# Patient Record
Sex: FEMALE | Race: BLACK OR AFRICAN AMERICAN | NOT HISPANIC OR LATINO | URBAN - METROPOLITAN AREA
[De-identification: names, ages, dates, MRNs, and addresses within clinical notes are randomized per-mention and may not be internally consistent; named-entity substitution may affect disease eponyms.]

---

## 2017-05-07 ENCOUNTER — EMERGENCY (EMERGENCY)
Facility: HOSPITAL | Age: 57
LOS: 1 days | Discharge: HOME | End: 2017-05-07
Attending: EMERGENCY MEDICINE | Admitting: EMERGENCY MEDICINE
Payer: COMMERCIAL

## 2017-05-07 VITALS
SYSTOLIC BLOOD PRESSURE: 158 MMHG | RESPIRATION RATE: 16 BRPM | TEMPERATURE: 98 F | HEART RATE: 90 BPM | DIASTOLIC BLOOD PRESSURE: 94 MMHG | OXYGEN SATURATION: 100 %

## 2017-05-07 PROCEDURE — 73562 X-RAY EXAM OF KNEE 3: CPT | Mod: 26,RT

## 2017-05-07 PROCEDURE — 99284 EMERGENCY DEPT VISIT MOD MDM: CPT

## 2017-05-07 RX ORDER — ACETAMINOPHEN 500 MG
650 TABLET ORAL ONCE
Qty: 0 | Refills: 0 | Status: COMPLETED | OUTPATIENT
Start: 2017-05-07 | End: 2017-05-07

## 2017-05-07 RX ADMIN — Medication 650 MILLIGRAM(S): at 11:45

## 2017-05-07 NOTE — ED PROVIDER NOTE - MEDICAL DECISION MAKING DETAILS
55 y/o F pt presents to the ED with right knee pain x1 week s/p can falling on her right knee. Likely ligamentous strain. Plan: XR r/o fx, Tylenol for pain.

## 2017-05-07 NOTE — ED PROVIDER NOTE - PROGRESS NOTE DETAILS
HUSSAIN Culp: Pt signed out to me by tristen ROQUE. Plan to follow up knee xray. Xray reviewed. Ace wrap applied. Pt stable for discharge and to follow up with pmd/ ortho if pain persists. HUSSAIN Culp: Pt signed out to me by tristen ROQUE. Plan to follow up knee xray. Xray reviewed. Ace wrap applied. Pt tolerated with no complaints. Pt stable for discharge and to follow up with pmd/ ortho if pain persists. Dr. Marshall: I performed the initial face to face bedside interview with this patient regarding history of present illness, review of symptoms and past medical, social and family history.  I completed an independent physical examination.  I was the initial provider who evaluated this patient.  The history, review of symptoms and examination was documented by the scribe in my presence and I attest to the accuracy of the documentation.  I have signed out the follow up of any pending tests (i.e. labs, radiological studies) to the PA.  I have discussed the patient’s plan of care and disposition with the PA.

## 2017-05-07 NOTE — ED PROVIDER NOTE - DETAILS:
Dr. Marshall:  I personally performed the services described in the documentation, reviewed the documentation recorded by the scribe in my presence and it accurately and completely records my words and action. The scribe's documentation has been prepared under my direction and personally reviewed by me in its entirety. I confirm that the note above accurately reflects all work, treatment, procedures, and medical decision making performed by me.

## 2017-05-07 NOTE — ED PROVIDER NOTE - NS ED MD SCRIBE ATTENDING SCRIBE SECTIONS
HIV/HISTORY OF PRESENT ILLNESS/PHYSICAL EXAM/DISPOSITION/PAST MEDICAL/SURGICAL/SOCIAL HISTORY/VITAL SIGNS( Pullset)/REVIEW OF SYSTEMS

## 2017-05-07 NOTE — ED PROVIDER NOTE - OBJECTIVE STATEMENT
57 y/o F pt with PMHx of HTN, DM, no significant PSHx c/o right knee pain x1 week. Pt states a metal can fell on her right knee few weeks ago. Pt has been wrapping up the knee. Denies difficulty bearing weight, numbness, weakness or any other complaints. NKDA. Current medication: amlodipine

## 2017-05-09 PROBLEM — Z00.00 ENCOUNTER FOR PREVENTIVE HEALTH EXAMINATION: Status: ACTIVE | Noted: 2017-05-09

## 2017-05-25 ENCOUNTER — APPOINTMENT (OUTPATIENT)
Dept: ORTHOPEDIC SURGERY | Facility: CLINIC | Age: 57
End: 2017-05-25

## 2017-05-25 VITALS
BODY MASS INDEX: 27.31 KG/M2 | HEART RATE: 92 BPM | SYSTOLIC BLOOD PRESSURE: 143 MMHG | HEIGHT: 64 IN | DIASTOLIC BLOOD PRESSURE: 90 MMHG | WEIGHT: 160 LBS

## 2017-09-28 ENCOUNTER — APPOINTMENT (OUTPATIENT)
Dept: ORTHOPEDIC SURGERY | Facility: CLINIC | Age: 57
End: 2017-09-28
Payer: COMMERCIAL

## 2017-09-28 VITALS
BODY MASS INDEX: 33.66 KG/M2 | DIASTOLIC BLOOD PRESSURE: 86 MMHG | WEIGHT: 190 LBS | HEIGHT: 63 IN | SYSTOLIC BLOOD PRESSURE: 135 MMHG | HEART RATE: 81 BPM

## 2017-09-28 PROCEDURE — 99214 OFFICE O/P EST MOD 30 MIN: CPT

## 2021-12-26 NOTE — ED PROVIDER NOTE - LOWER EXTREMITY EXAM, RIGHT
Spinal Block    Date/Time: 12/26/2021 8:05 AM  Performed by: Pedro Isaac MD  Authorized by: Pedro Isaac MD     Patient Location:  OR  Indication: primary anesthetic    Pre anesthesia checklist Patient Identified (2 criteria), Timeout Performed, Resuscitation equipment available, IV Bolus, Allergies confirmed, Monitors applied, Coagulation Status, Block site marked (if applicable), Sedation given (if needed), Block Plan Confirmed, Drugs/Solutions Labeled, IV Access functioning, Necessary Block Equipment Present, Consent Verified and Aseptic technique   Patient Position:  Sitting  Prep:  Chlorhexidine gluconate (CHG)  Max Sterile Barrier Technique:  Hand washing, cap/mask, sterile gloves and sterile drape  Monitoring:  Continuous pulse ox, EKG and NIBP  Oxygen Supplement:  Room Air  Approach:  Midline  Local Infiltration:  1% Lidocaine  Location:  L3-4  Injection Technique:  Single-shot  Needle Type:  Pencil-tip  Needle Gauge:  25 G  Needle Length:  9 cm  Initial Local Loading Anesthetic:  Bupivacaine and 0.75 Bupivacaine-Dextrose 8.25%  Initial Local Loading Concentration (%):  0.75  Initial Local Loading Volume (mL):  1.6  Initial Loading Narcotic Used:  Fentanyl   15 mcg  Other Initial Loading Narcotic Used:  PF Morphine   150 mcg    Assessment:   Number of Attempts: 1   Procedure Assessment: patient tolerated procedure well with no complications   Sensory Level:  T4  Performed By:  Anesthesiologist  Anesthesiologist:  Pedro Isaac MD  Start Time:  12/26/2021 8:04 AM        
tenderness to the medial aspect worse with extension

## 2022-03-13 ENCOUNTER — EMERGENCY (EMERGENCY)
Facility: HOSPITAL | Age: 62
LOS: 1 days | Discharge: HOME | End: 2022-03-13
Attending: STUDENT IN AN ORGANIZED HEALTH CARE EDUCATION/TRAINING PROGRAM | Admitting: EMERGENCY MEDICINE
Payer: COMMERCIAL

## 2022-03-13 VITALS
DIASTOLIC BLOOD PRESSURE: 78 MMHG | TEMPERATURE: 98 F | SYSTOLIC BLOOD PRESSURE: 161 MMHG | HEART RATE: 98 BPM | OXYGEN SATURATION: 99 % | RESPIRATION RATE: 20 BRPM

## 2022-03-13 PROBLEM — E11.9 TYPE 2 DIABETES MELLITUS WITHOUT COMPLICATIONS: Chronic | Status: ACTIVE | Noted: 2017-05-07

## 2022-03-13 PROBLEM — I10 ESSENTIAL (PRIMARY) HYPERTENSION: Chronic | Status: ACTIVE | Noted: 2017-05-07

## 2022-03-13 LAB — SARS-COV-2 RNA SPEC QL NAA+PROBE: SIGNIFICANT CHANGE UP

## 2022-03-13 PROCEDURE — 99284 EMERGENCY DEPT VISIT MOD MDM: CPT

## 2022-03-13 RX ORDER — CEPHALEXIN 500 MG
1 CAPSULE ORAL
Qty: 28 | Refills: 0
Start: 2022-03-13 | End: 2022-03-19

## 2022-03-13 RX ORDER — CEPHALEXIN 500 MG
500 CAPSULE ORAL ONCE
Refills: 0 | Status: COMPLETED | OUTPATIENT
Start: 2022-03-13 | End: 2022-03-13

## 2022-03-13 RX ADMIN — Medication 500 MILLIGRAM(S): at 16:30

## 2022-03-13 NOTE — ED PROVIDER NOTE - ATTENDING CONTRIBUTION TO CARE
I have personally performed a face to face medical and diagnostic evaluation of the patient. I have discussed with and reviewed the ACP's note and agree with the History, ROS, Physical Exam and MDM unless otherwise indicated. A brief summary of my personal evaluation and impression can be found below.    62yo  F hx htn dm p/w L eye redness, swelling, itching to upper eyelid since this AM. No eye pain, vision changes. Also endorsing some recent watery discharge from the eye w/ cough, sore throat. No FB sensation. No fevers, chills, HA, n/v, trauma to eye.  VITALS: Initial triage and subsequent vitals have been reviewed by me.  Gen: Well appearing, NAD, alert, non-toxic  Head: NCAT  HEENT: MMM, normal conjunctiva, anicteric, neck supple, PERRL, EOMI no pain w/ testing. Mild erythema/ swelling to upper eyelid and minimal extension beyond eyelid margin however no diffuse swelling to periorbit. No proptosis of eye   Lung: breathing comfortably  CV: regular rate  MSK: No edema, no visible deformities  Neuro: Moving all extremity grossly, following commands appropriately, fluid speech  Skin: Warm and dry, no evidence of rash  Psych: normal mood and affect  Apparent blepharitis w/o signs of periorbital/orbital cellulitis at this time. Given minimal extension beyond eyelid margin will give PO abx es pw/ hx of DM. No visual/eye complaints do not suspect intraocular etiology. PO/topical abx and return precautions

## 2022-03-13 NOTE — ED ADULT TRIAGE NOTE - CHIEF COMPLAINT QUOTE
Patient states that her left eye has been itching since last night. This morning when she woke up it was swollen and almost shut.

## 2022-03-13 NOTE — ED ADULT NURSE NOTE - OBJECTIVE STATEMENT
Pt AOX4 c/o swelling in Left eyelid, started overnight, pt denies any trauma to eye or bug bite; lid swollen and red, pt able to open eye. Denies any vision changes. Antibiotics given as per MD. Awaiting further Md orders.

## 2022-03-13 NOTE — ED PROVIDER NOTE - OBJECTIVE STATEMENT
61y F w/ H/O DM and HTN presents c/o L eye redness, swelling, and itching since she woke up this morning. Pt reports redness, swelling, and itchiness to the upper eyelid with some soreness. States her eyes were shot and she woke up with some tearing this morning. Pt denies eye discharge, visual changes, eye floaters, or double vision. Denies eye injuries, foreign bodies, or contact lenses. Pt also reports mild runny nose, sore throat, and dry cough. Denies any fever, chills, HA, dizziness, N/V, CP, abd pain, numbness, weakness, or any other complaints. Denies recent travel. 61y F w/ H/O DM and HTN presents c/o L eye redness, swelling, and itching since she woke up this morning. Pt reports redness, swelling, and itchiness to the upper eyelid with some soreness. States she woke up with some tearing and her eyes were shut. Pt denies eye discharge, visual changes, eye floaters, or double vision. Denies eye injuries, foreign bodies, or contact lenses. Pt also reports mild runny nose, sore throat, and dry cough. Denies any fever, chills, HA, dizziness, N/V, CP, abd pain, numbness, weakness, or any other complaints. Denies recent travel.

## 2022-03-13 NOTE — ED PROVIDER NOTE - NSFOLLOWUPINSTRUCTIONS_ED_ALL_ED_FT
Rest, drink plenty of fluids.  Advance activity as tolerated.  Continue all previously prescribed medications as directed.  Follow up with your primary care physician & ophthalmology in 48-72 hours- bring copies of your results.  Return to the ER for worsening or persistent symptoms, and/or ANY NEW OR CONCERNING SYMPTOMS. If you have issues obtaining follow up, please call: [phone] to obtain a doctor or specialist who takes your insurance in your area.     Take Keflex 500 mg 4 times a day for 7 days -- finish this antibiotic.   Erythromycin ointment Instill ~1 cm ribbon into affected eye(s) one or more times daily or at bedtime  Apply warm compress to affected area for 15 minutes, 3-4 times per day. Rest, drink plenty of fluids.  Advance activity as tolerated.  Continue all previously prescribed medications as directed.  Follow up with your primary care physician & ophthalmology in 48-72 hours- bring copies of your results.  Return to the ER for worsening or persistent symptoms, and/or ANY NEW OR CONCERNING SYMPTOMS. If you have issues obtaining follow up, please call: [phone] to obtain a doctor or specialist who takes your insurance in your area.     Take Keflex 500 mg 4 times a day for 7 days -- finish this antibiotic.   Erythromycin ointment Instill ~1 cm ribbon into affected eye(s) one or more times daily or at bedtime  Apply warm compress to affected area for 15 minutes, 3-4 times per day.    Ophthalmology clinic: [address] [place] [address], [place], [place], call [phone]) to make next available appointment.

## 2022-03-13 NOTE — ED PROVIDER NOTE - PATIENT PORTAL LINK FT
You can access the FollowMyHealth Patient Portal offered by [facility] by registering at the following website: [email or web address]. By joining [employer]’s FollowMyHealth portal, you will also be able to view your health information using other applications (apps) compatible with our system.

## 2022-03-13 NOTE — ED PROVIDER NOTE - EYES, MLM
L upper eyelid swollen, red, nontender to palpation. EOMI w/o pain, PERRL, and cornea clear. Vision 20/20. No palpable lesions.

## 2022-07-30 ENCOUNTER — EMERGENCY (EMERGENCY)
Facility: HOSPITAL | Age: 62
LOS: 1 days | Discharge: HOME | End: 2022-07-30
Attending: EMERGENCY MEDICINE | Admitting: EMERGENCY MEDICINE

## 2022-07-30 VITALS
OXYGEN SATURATION: 100 % | HEART RATE: 88 BPM | TEMPERATURE: 97 F | RESPIRATION RATE: 16 BRPM | DIASTOLIC BLOOD PRESSURE: 84 MMHG | SYSTOLIC BLOOD PRESSURE: 160 MMHG

## 2022-07-30 PROCEDURE — 99284 EMERGENCY DEPT VISIT MOD MDM: CPT

## 2022-07-30 PROCEDURE — 99053 MED SERV 10PM-8AM 24 HR FAC: CPT

## 2022-07-30 RX ORDER — IBUPROFEN 200 MG
400 TABLET ORAL ONCE
Refills: 0 | Status: COMPLETED | OUTPATIENT
Start: 2022-07-30 | End: 2022-07-30

## 2022-07-30 RX ORDER — LIDOCAINE 4 G/100G
1 CREAM TOPICAL ONCE
Refills: 0 | Status: COMPLETED | OUTPATIENT
Start: 2022-07-30 | End: 2022-07-30

## 2022-07-30 RX ORDER — ACETAMINOPHEN 500 MG
975 TABLET ORAL ONCE
Refills: 0 | Status: DISCONTINUED | OUTPATIENT
Start: 2022-07-30 | End: 2022-08-03
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
## 2022-07-31 VITALS
DIASTOLIC BLOOD PRESSURE: 79 MMHG | RESPIRATION RATE: 17 BRPM | OXYGEN SATURATION: 99 % | TEMPERATURE: 98 F | SYSTOLIC BLOOD PRESSURE: 150 MMHG | HEART RATE: 79 BPM

## 2022-07-31 PROCEDURE — 70450 CT HEAD/BRAIN W/O DYE: CPT | Mod: 26,MA

## 2022-07-31 PROCEDURE — 71046 X-RAY EXAM CHEST 2 VIEWS: CPT | Mod: 26

## 2022-07-31 RX ADMIN — Medication 400 MILLIGRAM(S): at 00:42

## 2022-07-31 RX ADMIN — LIDOCAINE 1 PATCH: 4 CREAM TOPICAL at 00:42

## 2022-07-31 NOTE — ED PROVIDER NOTE - NSFOLLOWUPINSTRUCTIONS_ED_ALL_ED_FT
You were seen in the Emergency Department for headache. Your CT scan and X-ray did not show any findings requiring hospitalization. You can take acetaminophen (1000mg every 6hrs) and ibuprofen (400mg every 6hrs) for headache. Please follow up with your primary care doctor.     1) Continue all previously prescribed medications as directed.    2) Follow up with your primary care physician - take copies of your results.    3) Return to the Emergency Department for worsening or persistent symptoms, and/or ANY NEW OR CONCERNING SYMPTOMS.
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
## 2022-07-31 NOTE — ED PROVIDER NOTE - PROGRESS NOTE DETAILS
SHANITA Jack (PGY-3) - pt w/ negative CT head and CXR w/o ptx or rib fractures. Feeling improved after motrin. Ha robles

## 2022-07-31 NOTE — ED PROVIDER NOTE - PHYSICAL EXAMINATION
General: NAD  HEENT: NCAT  Cardiac: RRR, 2+ radial pulses  Chest: CTA, +tenderness of the sternum and R chest  Abdomen: soft, non-distended, no ttp, no rebound or guarding  Extremities: no peripheral edema, calf tenderness, or leg size discrepancies  Skin: no rashes  Neuro: AAOx4, cns intact, 5+motor and sensory grossly intact, stable gait  Psych: mood and affect appropriate
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
## 2022-07-31 NOTE — ED PROVIDER NOTE - PATIENT PORTAL LINK FT
You can access the FollowMyHealth Patient Portal offered by [facility] by registering at the following website: [email or web address]. By joining [employer]’s FollowMyHealth portal, you will also be able to view your health information using other applications (apps) compatible with our system.

## 2023-04-13 ENCOUNTER — APPOINTMENT (OUTPATIENT)
Dept: ORTHOPEDIC SURGERY | Facility: CLINIC | Age: 63
End: 2023-04-13
Payer: OTHER MISCELLANEOUS

## 2023-04-13 VITALS — WEIGHT: 165 LBS | BODY MASS INDEX: 30.36 KG/M2 | HEIGHT: 62 IN

## 2023-04-13 DIAGNOSIS — S83.419A SPRAIN OF MEDIAL COLLATERAL LIGAMENT OF UNSPECIFIED KNEE, INITIAL ENCOUNTER: ICD-10-CM

## 2023-04-13 PROCEDURE — 73562 X-RAY EXAM OF KNEE 3: CPT | Mod: 50

## 2023-04-13 PROCEDURE — 99214 OFFICE O/P EST MOD 30 MIN: CPT

## 2023-04-14 PROBLEM — S83.419A SPRAIN OF MCL (MEDIAL COLLATERAL LIGAMENT) OF KNEE: Status: ACTIVE | Noted: 2017-05-25

## 2023-08-08 ENCOUNTER — APPOINTMENT (OUTPATIENT)
Dept: ORTHOPEDIC SURGERY | Facility: CLINIC | Age: 63
End: 2023-08-08
Payer: OTHER MISCELLANEOUS

## 2023-08-08 VITALS — BODY MASS INDEX: 28.52 KG/M2 | WEIGHT: 155 LBS | HEIGHT: 62 IN

## 2023-08-08 DIAGNOSIS — S83.242A OTHER TEAR OF MEDIAL MENISCUS, CURRENT INJURY, LEFT KNEE, INITIAL ENCOUNTER: ICD-10-CM

## 2023-08-08 PROCEDURE — 99214 OFFICE O/P EST MOD 30 MIN: CPT

## 2023-08-11 PROBLEM — S83.242A ACUTE MEDIAL MENISCAL TEAR, LEFT, INITIAL ENCOUNTER: Status: ACTIVE | Noted: 2023-08-11

## 2023-08-28 NOTE — ED ADULT TRIAGE NOTE - ESI TRIAGE ACUITY LEVEL, MLM
Blaire Fox Francoise  August 28, 2023  Plan of Care Hand-off Note     Patient Care Path: observation    Plan for Care:   It is the recommendation of this writer that pt remain on observation status for continued monitoring of suicidal ideation. If pt further stabilizes in the ED, she would benefit from a referral for an IOP in addition to her outpatient therapy and psychiatry.    Identified Goals and Safety Issues: rapid cycling of lula and depression, suicidal ideation    Overview:  Rey Merino, , PH: [phone]      Legal Status at Admission: Voluntary/Patient has signed consent for treatment    Psychiatry Consult: No psychiatry consult needed at this time. [facility]       Updated RN and attending provider regarding plan of care.      FLAVIO Zuniga        
4

## 2023-09-05 ENCOUNTER — EMERGENCY (EMERGENCY)
Facility: HOSPITAL | Age: 63
LOS: 1 days | Discharge: HOME | End: 2023-09-05
Attending: EMERGENCY MEDICINE | Admitting: EMERGENCY MEDICINE
Payer: COMMERCIAL

## 2023-09-05 VITALS
DIASTOLIC BLOOD PRESSURE: 76 MMHG | OXYGEN SATURATION: 100 % | HEART RATE: 88 BPM | TEMPERATURE: 98 F | SYSTOLIC BLOOD PRESSURE: 124 MMHG | RESPIRATION RATE: 18 BRPM

## 2023-09-05 VITALS
OXYGEN SATURATION: 99 % | TEMPERATURE: 99 F | HEART RATE: 97 BPM | SYSTOLIC BLOOD PRESSURE: 135 MMHG | RESPIRATION RATE: 16 BRPM | DIASTOLIC BLOOD PRESSURE: 87 MMHG

## 2023-09-05 LAB
ALBUMIN SERPL ELPH-MCNC: 4.1 G/DL — SIGNIFICANT CHANGE UP (ref 3.3–5)
ALP SERPL-CCNC: 63 U/L — SIGNIFICANT CHANGE UP (ref 40–120)
ALT FLD-CCNC: 18 U/L — SIGNIFICANT CHANGE UP (ref 4–33)
ANION GAP SERPL CALC-SCNC: 13 MMOL/L — SIGNIFICANT CHANGE UP (ref 7–14)
APPEARANCE UR: CLEAR — SIGNIFICANT CHANGE UP
AST SERPL-CCNC: 14 U/L — SIGNIFICANT CHANGE UP (ref 4–32)
B-OH-BUTYR SERPL-SCNC: <0 MMOL/L — SIGNIFICANT CHANGE UP (ref 0–0.4)
BASE EXCESS BLDV CALC-SCNC: 0.5 MMOL/L — SIGNIFICANT CHANGE UP (ref -2–3)
BASOPHILS # BLD AUTO: 0.07 K/UL — SIGNIFICANT CHANGE UP (ref 0–0.2)
BASOPHILS NFR BLD AUTO: 0.7 % — SIGNIFICANT CHANGE UP (ref 0–2)
BILIRUB SERPL-MCNC: 0.4 MG/DL — SIGNIFICANT CHANGE UP (ref 0.2–1.2)
BILIRUB UR-MCNC: NEGATIVE — SIGNIFICANT CHANGE UP
BUN SERPL-MCNC: 20 MG/DL — SIGNIFICANT CHANGE UP (ref 7–23)
CA-I SERPL-SCNC: 1.23 MMOL/L — SIGNIFICANT CHANGE UP (ref 1.15–1.33)
CALCIUM SERPL-MCNC: 9.4 MG/DL — SIGNIFICANT CHANGE UP (ref 8.4–10.5)
CHLORIDE BLDV-SCNC: 100 MMOL/L — SIGNIFICANT CHANGE UP (ref 96–108)
CHLORIDE SERPL-SCNC: 98 MMOL/L — SIGNIFICANT CHANGE UP (ref 98–107)
CO2 BLDV-SCNC: 28.5 MMOL/L — HIGH (ref 22–26)
CO2 SERPL-SCNC: 24 MMOL/L — SIGNIFICANT CHANGE UP (ref 22–31)
COLOR SPEC: YELLOW — SIGNIFICANT CHANGE UP
CREAT SERPL-MCNC: 0.99 MG/DL — SIGNIFICANT CHANGE UP (ref 0.5–1.3)
DIFF PNL FLD: NEGATIVE — SIGNIFICANT CHANGE UP
EGFR: 64 ML/MIN/1.73M2 — SIGNIFICANT CHANGE UP
EOSINOPHIL # BLD AUTO: 0.01 K/UL — SIGNIFICANT CHANGE UP (ref 0–0.5)
EOSINOPHIL NFR BLD AUTO: 0.1 % — SIGNIFICANT CHANGE UP (ref 0–6)
GAS PNL BLDV: 133 MMOL/L — LOW (ref 136–145)
GAS PNL BLDV: SIGNIFICANT CHANGE UP
GLUCOSE BLDV-MCNC: 441 MG/DL — HIGH (ref 70–99)
GLUCOSE SERPL-MCNC: 409 MG/DL — HIGH (ref 70–99)
GLUCOSE UR QL: >=1000 MG/DL
HCO3 BLDV-SCNC: 27 MMOL/L — SIGNIFICANT CHANGE UP (ref 22–29)
HCT VFR BLD CALC: 38.4 % — SIGNIFICANT CHANGE UP (ref 34.5–45)
HCT VFR BLDA CALC: 38 % — SIGNIFICANT CHANGE UP (ref 34.5–46.5)
HGB BLD CALC-MCNC: 12.5 G/DL — SIGNIFICANT CHANGE UP (ref 11.7–16.1)
HGB BLD-MCNC: 12.1 G/DL — SIGNIFICANT CHANGE UP (ref 11.5–15.5)
IANC: 8.21 K/UL — HIGH (ref 1.8–7.4)
IMM GRANULOCYTES NFR BLD AUTO: 0.3 % — SIGNIFICANT CHANGE UP (ref 0–0.9)
KETONES UR-MCNC: NEGATIVE MG/DL — SIGNIFICANT CHANGE UP
LACTATE BLDV-MCNC: 3.4 MMOL/L — HIGH (ref 0.5–2)
LEUKOCYTE ESTERASE UR-ACNC: NEGATIVE — SIGNIFICANT CHANGE UP
LYMPHOCYTES # BLD AUTO: 1.11 K/UL — SIGNIFICANT CHANGE UP (ref 1–3.3)
LYMPHOCYTES # BLD AUTO: 11 % — LOW (ref 13–44)
MAGNESIUM SERPL-MCNC: 1.6 MG/DL — SIGNIFICANT CHANGE UP (ref 1.6–2.6)
MCHC RBC-ENTMCNC: 29.6 PG — SIGNIFICANT CHANGE UP (ref 27–34)
MCHC RBC-ENTMCNC: 31.5 GM/DL — LOW (ref 32–36)
MCV RBC AUTO: 93.9 FL — SIGNIFICANT CHANGE UP (ref 80–100)
MONOCYTES # BLD AUTO: 0.64 K/UL — SIGNIFICANT CHANGE UP (ref 0–0.9)
MONOCYTES NFR BLD AUTO: 6.4 % — SIGNIFICANT CHANGE UP (ref 2–14)
NEUTROPHILS # BLD AUTO: 8.21 K/UL — HIGH (ref 1.8–7.4)
NEUTROPHILS NFR BLD AUTO: 81.5 % — HIGH (ref 43–77)
NITRITE UR-MCNC: NEGATIVE — SIGNIFICANT CHANGE UP
NRBC # BLD: 0 /100 WBCS — SIGNIFICANT CHANGE UP (ref 0–0)
NRBC # FLD: 0 K/UL — SIGNIFICANT CHANGE UP (ref 0–0)
PCO2 BLDV: 50 MMHG — SIGNIFICANT CHANGE UP (ref 39–52)
PH BLDV: 7.34 — SIGNIFICANT CHANGE UP (ref 7.32–7.43)
PH UR: 5.5 — SIGNIFICANT CHANGE UP (ref 5–8)
PLATELET # BLD AUTO: 181 K/UL — SIGNIFICANT CHANGE UP (ref 150–400)
PO2 BLDV: 39 MMHG — SIGNIFICANT CHANGE UP (ref 25–45)
POTASSIUM BLDV-SCNC: 4.6 MMOL/L — SIGNIFICANT CHANGE UP (ref 3.5–5.1)
POTASSIUM SERPL-MCNC: 4.7 MMOL/L — SIGNIFICANT CHANGE UP (ref 3.5–5.3)
POTASSIUM SERPL-SCNC: 4.7 MMOL/L — SIGNIFICANT CHANGE UP (ref 3.5–5.3)
PROT SERPL-MCNC: 7.8 G/DL — SIGNIFICANT CHANGE UP (ref 6–8.3)
PROT UR-MCNC: NEGATIVE MG/DL — SIGNIFICANT CHANGE UP
RBC # BLD: 4.09 M/UL — SIGNIFICANT CHANGE UP (ref 3.8–5.2)
RBC # FLD: 13.1 % — SIGNIFICANT CHANGE UP (ref 10.3–14.5)
SAO2 % BLDV: 59.2 % — LOW (ref 67–88)
SODIUM SERPL-SCNC: 135 MMOL/L — SIGNIFICANT CHANGE UP (ref 135–145)
SP GR SPEC: 1.02 — SIGNIFICANT CHANGE UP (ref 1–1.03)
UROBILINOGEN FLD QL: 0.2 MG/DL — SIGNIFICANT CHANGE UP (ref 0.2–1)
WBC # BLD: 10.07 K/UL — SIGNIFICANT CHANGE UP (ref 3.8–10.5)
WBC # FLD AUTO: 10.07 K/UL — SIGNIFICANT CHANGE UP (ref 3.8–10.5)

## 2023-09-05 PROCEDURE — 99284 EMERGENCY DEPT VISIT MOD MDM: CPT

## 2023-09-05 RX ORDER — CYCLOBENZAPRINE HYDROCHLORIDE 10 MG/1
5 TABLET, FILM COATED ORAL ONCE
Refills: 0 | Status: COMPLETED | OUTPATIENT
Start: 2023-09-05 | End: 2023-09-05

## 2023-09-05 RX ORDER — SODIUM CHLORIDE 9 MG/ML
1000 INJECTION, SOLUTION INTRAVENOUS ONCE
Refills: 0 | Status: COMPLETED | OUTPATIENT
Start: 2023-09-05 | End: 2023-09-05

## 2023-09-05 RX ORDER — CYCLOBENZAPRINE HYDROCHLORIDE 10 MG/1
1 TABLET, FILM COATED ORAL
Qty: 6 | Refills: 0
Start: 2023-09-05 | End: 2023-09-06

## 2023-09-05 RX ORDER — IBUPROFEN 200 MG
400 TABLET ORAL ONCE
Refills: 0 | Status: COMPLETED | OUTPATIENT
Start: 2023-09-05 | End: 2023-09-05

## 2023-09-05 RX ADMIN — CYCLOBENZAPRINE HYDROCHLORIDE 5 MILLIGRAM(S): 10 TABLET, FILM COATED ORAL at 14:45

## 2023-09-05 RX ADMIN — Medication 400 MILLIGRAM(S): at 11:11

## 2023-09-05 RX ADMIN — SODIUM CHLORIDE 1000 MILLILITER(S): 9 INJECTION, SOLUTION INTRAVENOUS at 11:11

## 2023-09-05 NOTE — ED PROVIDER NOTE - PHYSICAL EXAMINATION
GEN: Awake, AOx3, NAD.  HEENT: NCAT  CARDIO: RRR. Normal S1/S2, no m/r/g. No JVD.  RESP: CTAB, no w/r/r  ABD: Soft, NTND.  : No CVAT.   MSK: No obvious deformity or ROM deficit. 2+ pulses x4. No edema.  SKIN: Warm, dry. No rashes. Nail beds without cyanosis or clubbing.  NEURO: Moves all four extremities spontaneously  PSYCH: Appropriate mood & affect.

## 2023-09-05 NOTE — ED PROVIDER NOTE - OBJECTIVE STATEMENT
62F with h/o HTN, T2DM (on not on insulin) presents with left-sided back pain that radiates down her buttock. It started when she woke up today and is limiting her ability to do her job / walk. She is able to walk and went to the employee health service to rule out COVID, though she has no cough/CP/SOB/fever. She applied a lidocaine patch & took acetaminohpen prior to arrival with some improvement.

## 2023-09-05 NOTE — ED PROVIDER NOTE - NSFOLLOWUPINSTRUCTIONS_ED_ALL_ED_FT
Yes You were seen in the Emergency Department for low back pain. Most likely, this is musculoskeletal strain that will improve with supportive care at home. We recommend that you follow-up with your Primary Care Provider who may refer you to physical therapy should these persist.     We also found that your blood glucose levels were quite high. We checked your A1c, which was 9.4% You were seen in the Emergency Department for low back pain. Most likely, this is musculoskeletal strain that will improve with supportive care at home. We recommend that you follow-up with your Primary Care Provider who may refer you to physical therapy should these persist.     FOR PAIN, you may take:  - Acetaminophen 975-1000mg by mouth every 6 hours for 48-72 hours, then as needed  - Ibuprofen 400mg by mouth every 6-8 hours for 48 hours, then as needed  - Cyclobenzaprine 5mg by mouth every 8 hours as needed for severe, breakthrough pain (sent to your pharmacy)    We also found that your blood glucose levels were quite high. We checked your A1c, which was 9.4%. This is indicative of persistently high blood sugar levels over the last 3 months. We recommend that you keep a log of your blood sugars checked at varying times during the day throughout the weekend (e.g. Monday - before breakfast, Tuesday - before lunch, Wednesday - before dinner, Thursday - before bedtime, etc.). Keep this long and bring it with you to your Primary Care / Endocrinology follow-up. These high sugars can cause long-term complications with your vision, kidney function, and nerves.    You should return to the Emergency Department if you develop nausea/vomiting, abdominal pain, weakness/dizziness, loss of consciousness, or any other new or concerning symptoms that you would like to have evaluated.

## 2023-09-05 NOTE — ED PROVIDER NOTE - PROGRESS NOTE DETAILS
Favian Boone MD PGY-3  Pt still with pain, visualized ambulating to bathroom. FSBGL came back down w/ IVF. A1c 9.4%. Unclear when pt started on 3-drug regimen, but pt may benefit from Insulin administration non-urgently. Can f/u with OP endocrinologist/PCP.

## 2023-09-05 NOTE — ED PROVIDER NOTE - CARE PROVIDER_API CALL
Truong Merritt  Sleep Medicine  [address] [place] [address]  Phone: [phone]  Fax: [phone]  Established Patient  Follow Up Time: 1-3 Days

## 2023-09-05 NOTE — ED ADULT NURSE REASSESSMENT NOTE - NS ED NURSE REASSESS COMMENT FT1
patient received from previous RN. no acute distress noted at this time. respirations even, nonlabored on RA. safety and comfort maintained. pt ambulatory to rest room with steady gait. IV fluids infusing as ordered per EMR. no requests at this time. pt pending results. endorsing pain to L hip/buttock area awaiting new orders.

## 2023-09-05 NOTE — ED ADULT TRIAGE NOTE - CHIEF COMPLAINT QUOTE
left sided back and hip pain since last night, no fall or trauma, pain with ambulating, h/o T2MD, HTN,

## 2023-09-05 NOTE — ED PROVIDER NOTE - CARE PLAN
1 Principal Discharge DX:	Hyperglycemia  Secondary Diagnosis:	Pain of back and left lower extremity

## 2023-09-05 NOTE — ED PROVIDER NOTE - ATTENDING CONTRIBUTION TO CARE
I performed a history and physical exam of the patient and discussed their management with the resident and /or advanced care provider. I reviewed the resident and /or ACP's note and agree with the documented findings and plan of care. My medical decision making and observations are found above.  Lungs clear abd soft, moving and walking well.

## 2023-09-05 NOTE — ED ADULT NURSE NOTE - NSFALLUNIVINTERV_ED_ALL_ED
Bed/Stretcher in lowest position, wheels locked, appropriate side rails in place/Call bell, personal items and telephone in reach/Instruct patient to call for assistance before getting out of bed/chair/stretcher/Non-slip footwear applied when patient is off stretcher/[place] to call system/Physically safe environment - no spills, clutter or unnecessary equipment/Purposeful proactive rounding/Room/bathroom lighting operational, light cord in reach

## 2023-09-05 NOTE — ED PROVIDER NOTE - CLINICAL SUMMARY MEDICAL DECISION MAKING FREE TEXT BOX
Kaelyn: Patient is a 62-year-old who presents to the emergency department with complaints of left flank left back and left leg pain.  This is very emotional related to his been going on for about a day.  Patient put pain patch on there and has been taking Tylenol.  Patient is stable able to walk but the pain is disturbing her.  On arrival patient found to have a blood glucose of 495.  Patient is a known diabetic.  Will check the patient for DKA we will start to treat the sugar and get it down below 300 as we treat her pain. Lab studies ordered, independently reviewed and acted on as appropriate.

## 2023-09-05 NOTE — ED PROVIDER NOTE - PATIENT PORTAL LINK FT
You can access the FollowMyHealth Patient Portal offered by [facility] by registering at the following website: [email or web address]. By joining [employer]’s FollowMyHealth portal, you will also be able to view your health information using other applications (apps) compatible with our system.

## 2023-09-05 NOTE — ED ADULT NURSE NOTE - OBJECTIVE STATEMENT
Pt received to rm 5, awake and alert, A&OX4, ambulatory. C/o L sided back pain with radiation down to L buttock x1 day.  Respirations even and unlabored. Denies CP, SOB, N/V, HA, dizziness, palpitations, blurry vision, hematuria, dysuria, numbness/tingling, incontinence, any injury or trauma. 20G IV placed to R AC. Reports some relief from Tylenol and lidocaine patch prior to arrival. Bed in lowest position, call bell within reach. Safety maintained.

## 2024-09-26 NOTE — ED PROVIDER NOTE - CLINICAL SUMMARY MEDICAL DECISION MAKING FREE TEXT BOX
8:23 AM    Reason for Call: Phone Call    Description: Patients spouse called and states he is needing a new CPAP machine but was told by medical supply that he will need another sleep study done before getting the new machine. Prabha is wondering if an order can be placed for the sleep study. Please return her call     Was an appointment offered for this call? No    Preferred method for responding to this message: Telephone Call  What is your phone number ?[phone]    If we cannot reach you directly, may we leave a detailed response at the number you provided? Yes    Can this message wait until your PCP/provider returns, if available today? Not applicable    Dena Kessler     Done, srinivasan   OK for sleep study placement?   61y F w/ H/O DM and HTN c/o L eye redness, swelling, itchiness since this morning. C/f stye, conjunctivitis, cellulitis. Less likely allergic rxn. Plan: eye exam, COVID test, and reassess. 61y F w/ H/O DM and HTN c/o L eye redness, swelling, itchiness since this morning. Likely blepharitis. Ddx: stye, conjunctivitis, cellulitis. Less likely allergic rxn. Plan: eye exam, COVID test, and reassess. Statement Selected

## 2024-11-27 NOTE — ED PROVIDER NOTE - NS ED NOTE AC HIGH RISK COUNTRIES
The fax from progress states that \"Zepbound autoinjector has been rejected and requires prior authorization\".  Possible alternatives are given, but they all likely also require authorizations.  So, I am not entirely clear if these medications are covered any better.  Saxenda and Wegovy are on the list.  I will send in Wegovy now.  Please process authorization if the same message comes back.   No

## 2025-05-12 ENCOUNTER — EMERGENCY (EMERGENCY)
Facility: HOSPITAL | Age: 65
LOS: 1 days | End: 2025-05-12
Attending: EMERGENCY MEDICINE | Admitting: EMERGENCY MEDICINE
Payer: COMMERCIAL

## 2025-05-12 VITALS
HEART RATE: 89 BPM | DIASTOLIC BLOOD PRESSURE: 87 MMHG | SYSTOLIC BLOOD PRESSURE: 159 MMHG | WEIGHT: 169.98 LBS | RESPIRATION RATE: 18 BRPM | OXYGEN SATURATION: 98 % | HEIGHT: 62 IN | TEMPERATURE: 98 F

## 2025-05-12 VITALS
SYSTOLIC BLOOD PRESSURE: 163 MMHG | DIASTOLIC BLOOD PRESSURE: 86 MMHG | HEART RATE: 86 BPM | OXYGEN SATURATION: 98 % | RESPIRATION RATE: 16 BRPM | TEMPERATURE: 98 F

## 2025-05-12 PROCEDURE — 93971 EXTREMITY STUDY: CPT | Mod: 26,RT

## 2025-05-12 PROCEDURE — 99284 EMERGENCY DEPT VISIT MOD MDM: CPT

## 2025-05-12 PROCEDURE — 73562 X-RAY EXAM OF KNEE 3: CPT | Mod: 26,RT

## 2025-05-12 RX ORDER — CYCLOBENZAPRINE HYDROCHLORIDE 15 MG/1
1 CAPSULE, EXTENDED RELEASE ORAL
Qty: 30 | Refills: 0
Start: 2025-05-12 | End: 2025-05-21

## 2025-05-12 RX ORDER — CYCLOBENZAPRINE HYDROCHLORIDE 15 MG/1
1 CAPSULE, EXTENDED RELEASE ORAL
Qty: 6 | Refills: 0
Start: 2025-05-12 | End: 2025-05-13

## 2025-05-12 NOTE — ED PROVIDER NOTE - MUSCULOSKELETAL, MLM
Spine appears normal, range of motion is not limited, +right knee ttp with mild swelling and + calf ttp.

## 2025-05-12 NOTE — ED ADULT TRIAGE NOTE - CHIEF COMPLAINT QUOTE
Pt. c/o right knee pain starting yesterday while at work. 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 No swelling noted.

## 2025-05-12 NOTE — ED PROVIDER NOTE - PATIENT PORTAL LINK FT
You can access the FollowMyHealth Patient Portal offered by [facility] by registering at the following website: [email or web address]. By joining [employer]’s FollowMyHealth portal, you will also be able to view your health information using other applications (apps) compatible with our system.

## 2025-05-12 NOTE — ED PROVIDER NOTE - CLINICAL SUMMARY MEDICAL DECISION MAKING FREE TEXT BOX
Will check right knee xray to r/o fracture and will perform right venous duplex to r/o DVT although unlikely and will dc to follow with pmd as an outpatient.

## 2025-05-12 NOTE — ED PROVIDER NOTE - OBJECTIVE STATEMENT
63 yo F with PMHx of HTN, T2DM here with right side knee pain starting yesterday while the patient was standing at work. Denies trauma to the knee. 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 Denies f/c, sob, cp, palpitations, n/v abdominal pain, paresthesias, weakness.

## 2025-05-21 ENCOUNTER — APPOINTMENT (OUTPATIENT)
Dept: ORTHOPEDIC SURGERY | Facility: CLINIC | Age: 65
End: 2025-05-21

## 2025-05-21 VITALS
WEIGHT: 155 LBS | HEART RATE: 85 BPM | DIASTOLIC BLOOD PRESSURE: 100 MMHG | OXYGEN SATURATION: 99 % | SYSTOLIC BLOOD PRESSURE: 153 MMHG | BODY MASS INDEX: 28.52 KG/M2 | HEIGHT: 62 IN

## 2025-05-21 DIAGNOSIS — M25.461 EFFUSION, RIGHT KNEE: ICD-10-CM

## 2025-05-21 PROCEDURE — 20611 DRAIN/INJ JOINT/BURSA W/US: CPT | Mod: RT

## 2025-05-21 PROCEDURE — 99204 OFFICE O/P NEW MOD 45 MIN: CPT | Mod: 25

## 2025-05-21 PROCEDURE — 99203 OFFICE O/P NEW LOW 30 MIN: CPT | Mod: 25

## 2025-06-18 ENCOUNTER — APPOINTMENT (OUTPATIENT)
Dept: ORTHOPEDIC SURGERY | Facility: CLINIC | Age: 65
End: 2025-06-18

## 2025-06-18 VITALS — HEIGHT: 62 IN | WEIGHT: 151 LBS | BODY MASS INDEX: 27.79 KG/M2

## 2025-06-18 PROCEDURE — 20611 DRAIN/INJ JOINT/BURSA W/US: CPT | Mod: RT
